# Patient Record
Sex: FEMALE | Race: OTHER | Employment: UNEMPLOYED | URBAN - METROPOLITAN AREA
[De-identification: names, ages, dates, MRNs, and addresses within clinical notes are randomized per-mention and may not be internally consistent; named-entity substitution may affect disease eponyms.]

---

## 2018-04-05 ENCOUNTER — HOSPITAL ENCOUNTER (OUTPATIENT)
Facility: HOSPITAL | Age: 33
Setting detail: OBSERVATION
Discharge: HOME | End: 2018-04-07
Attending: EMERGENCY MEDICINE | Admitting: INTERNAL MEDICINE
Payer: MEDICAID

## 2018-04-05 ENCOUNTER — APPOINTMENT (OUTPATIENT)
Dept: ULTRASOUND IMAGING | Facility: HOSPITAL | Age: 33
End: 2018-04-05
Attending: INTERNAL MEDICINE
Payer: MEDICAID

## 2018-04-05 DIAGNOSIS — R73.9 HYPERGLYCEMIA: ICD-10-CM

## 2018-04-05 DIAGNOSIS — R10.13 EPIGASTRIC PAIN: Primary | ICD-10-CM

## 2018-04-05 PROBLEM — R10.9 ABDOMINAL PAIN: Status: ACTIVE | Noted: 2018-04-05

## 2018-04-05 PROBLEM — E87.6 HYPOKALEMIA: Status: ACTIVE | Noted: 2018-04-05

## 2018-04-05 PROCEDURE — 76705 ECHO EXAM OF ABDOMEN: CPT | Performed by: INTERNAL MEDICINE

## 2018-04-05 RX ORDER — SODIUM CHLORIDE 9 MG/ML
INJECTION, SOLUTION INTRAVENOUS CONTINUOUS
Status: DISCONTINUED | OUTPATIENT
Start: 2018-04-05 | End: 2018-04-07

## 2018-04-05 RX ORDER — MORPHINE SULFATE 2 MG/ML
INJECTION, SOLUTION INTRAMUSCULAR; INTRAVENOUS
Status: DISPENSED
Start: 2018-04-05 | End: 2018-04-05

## 2018-04-05 RX ORDER — HYDROMORPHONE HYDROCHLORIDE 1 MG/ML
0.5 INJECTION, SOLUTION INTRAMUSCULAR; INTRAVENOUS; SUBCUTANEOUS EVERY 4 HOURS PRN
Status: DISCONTINUED | OUTPATIENT
Start: 2018-04-05 | End: 2018-04-07

## 2018-04-05 RX ORDER — OMEPRAZOLE 40 MG/1
40 CAPSULE, DELAYED RELEASE ORAL 2 TIMES DAILY
COMMUNITY
End: 2020-03-05

## 2018-04-05 RX ORDER — INSULIN ASPART 100 [IU]/ML
8 INJECTION, SOLUTION INTRAVENOUS; SUBCUTANEOUS ONCE
Status: COMPLETED | OUTPATIENT
Start: 2018-04-05 | End: 2018-04-05

## 2018-04-05 RX ORDER — MAGNESIUM HYDROXIDE/ALUMINUM HYDROXICE/SIMETHICONE 120; 1200; 1200 MG/30ML; MG/30ML; MG/30ML
30 SUSPENSION ORAL ONCE
Status: COMPLETED | OUTPATIENT
Start: 2018-04-05 | End: 2018-04-05

## 2018-04-05 RX ORDER — LORAZEPAM 2 MG/ML
INJECTION INTRAMUSCULAR
Status: COMPLETED
Start: 2018-04-05 | End: 2018-04-05

## 2018-04-05 RX ORDER — MORPHINE SULFATE 2 MG/ML
2 INJECTION, SOLUTION INTRAMUSCULAR; INTRAVENOUS ONCE
Status: COMPLETED | OUTPATIENT
Start: 2018-04-05 | End: 2018-04-05

## 2018-04-05 RX ORDER — LORAZEPAM 2 MG/ML
0.5 INJECTION INTRAMUSCULAR ONCE
Status: COMPLETED | OUTPATIENT
Start: 2018-04-05 | End: 2018-04-05

## 2018-04-05 RX ORDER — MORPHINE SULFATE 2 MG/ML
2 INJECTION, SOLUTION INTRAMUSCULAR; INTRAVENOUS ONCE
Status: DISCONTINUED | OUTPATIENT
Start: 2018-04-05 | End: 2018-04-05

## 2018-04-05 RX ORDER — DEXTROSE MONOHYDRATE 25 G/50ML
50 INJECTION, SOLUTION INTRAVENOUS AS NEEDED
Status: DISCONTINUED | OUTPATIENT
Start: 2018-04-05 | End: 2018-04-07

## 2018-04-05 RX ORDER — ONDANSETRON 2 MG/ML
4 INJECTION INTRAMUSCULAR; INTRAVENOUS ONCE
Status: COMPLETED | OUTPATIENT
Start: 2018-04-05 | End: 2018-04-05

## 2018-04-05 RX ORDER — HYDROCODONE BITARTRATE AND ACETAMINOPHEN 7.5; 325 MG/1; MG/1
1 TABLET ORAL EVERY 6 HOURS PRN
Status: DISCONTINUED | OUTPATIENT
Start: 2018-04-05 | End: 2018-04-07

## 2018-04-05 NOTE — ED INITIAL ASSESSMENT (HPI)
Pt here with vomiting since Monday. Seen at [facility] on Tuesday. Saw pmd yesterday. Still vomiting. Pain is mid abdomen.  No diarrhea

## 2018-04-05 NOTE — CONSULTS
[facility] HOSP - [facility]    Report of Consultation    Gabbie Carballo Patient Status:  Emergency    1985 MRN [id number]   Location [address] Attending Sri Rebolledo MD   Hosp Day # 0 PCP Rambo Webb MD     Reason f conjunctiva/corneas clear      Ears:     Nose:    Throat:    Neck:   Supple, symmetrical, trachea midline, no adenopathy;        thyroid:  No enlargement/tenderness/nodules; no carotid    bruit or JVD   Back:     Symmetric, no curvature, ROM normal, no CVA

## 2018-04-05 NOTE — ED PROVIDER NOTES
Patient Seen in: [facility] Emergency Department    History   Patient presents with:  Abdomen/Flank Pain (GI/)    Stated Complaint:     HPI    Patient complains of epigastric abdominal pain that began to get worse the past few days.   She was sco negative except as noted above. PSFH elements reviewed from today and agreed except as otherwise stated in HPI.     Physical Exam   ED Triage Vitals [04/05/18 1009]  BP: 143/80  Pulse: 117  Resp: 22  Temp: 98.3 °F (36.8 °C)  Temp src: Oral  SpO2: 100 % Bacteria Urine Few (*)     All other components within normal limits   ACETONE - Normal   EMH POCT PREGNANCY URINE - Normal   RAINBOW DRAW BLUE   RAINBOW DRAW LAVENDER   RAINBOW DRAW DARK GREEN   RAINBOW DRAW LIGHT GREEN   RAINBOW DRAW GOLD   RAINBOW DRAW

## 2018-04-06 ENCOUNTER — APPOINTMENT (OUTPATIENT)
Dept: MRI IMAGING | Facility: HOSPITAL | Age: 33
End: 2018-04-06
Attending: INTERNAL MEDICINE
Payer: MEDICAID

## 2018-04-06 PROCEDURE — 74181 MRI ABDOMEN W/O CONTRAST: CPT | Performed by: INTERNAL MEDICINE

## 2018-04-06 RX ORDER — POTASSIUM CHLORIDE 20 MEQ/1
40 TABLET, EXTENDED RELEASE ORAL EVERY 4 HOURS
Status: COMPLETED | OUTPATIENT
Start: 2018-04-06 | End: 2018-04-06

## 2018-04-06 RX ORDER — MAGNESIUM OXIDE 400 MG (241.3 MG MAGNESIUM) TABLET
400 TABLET ONCE
Status: COMPLETED | OUTPATIENT
Start: 2018-04-06 | End: 2018-04-06

## 2018-04-06 RX ORDER — POTASSIUM CHLORIDE 20 MEQ/1
40 TABLET, EXTENDED RELEASE ORAL ONCE
Status: DISCONTINUED | OUTPATIENT
Start: 2018-04-06 | End: 2018-04-06

## 2018-04-06 RX ORDER — POTASSIUM CHLORIDE 20 MEQ/1
40 TABLET, EXTENDED RELEASE ORAL ONCE
Status: COMPLETED | OUTPATIENT
Start: 2018-04-06 | End: 2018-04-06

## 2018-04-06 NOTE — H&P
[facility] - [facility]    History and Physical    Jessika Perez Patient Status:  Observation    1985 MRN [id number]   Location [facility] Attending Kierra Flanagan MD   Hosp Day # 0 PCP Ihsan Rivera MD     Date:  2018  Date of Ad Therapy Supplies (NEBULIZER/TUBING/MOUTHPIECE) Does not apply Kit Use as needed   albuterol sulfate (2.5 MG/3ML) 0.083% Inhalation Nebu Soln Take 3 mL (2.5 mg total) by nebulization every 4 (four) hours as needed for Wheezing.    Insulin Lispro 100 UNIT/ML 04/06/2018   AST 35 04/06/2018   ALT 57 (H) 04/06/2018   LIP 15 (L) 04/05/2018   MG 1.6 (L) 04/06/2018               Assessment/Plan:     Epigastric pain  ?etiology/ gi consult noted/ ivf's/ ppi/ zofran/compazine/ mri/mrcp of abdomen/ pain control  Dm  Sli

## 2018-04-06 NOTE — PROGRESS NOTES
GI  PROGRESS NOTE    SUBJECTIVE: still c/o RUQ pain; nauseated. Not eating much.        OBJECTIVE:  Temp:  [98.3 °F (36.8 °C)-98.7 °F (37.1 °C)] 98.3 °F (36.8 °C)  Pulse:  [67-96] 80  Resp:  [18-20] 18  BP: (100-140)/(58-80) 113/64  Exam  Gen: No acute di

## 2018-04-06 NOTE — PLAN OF CARE
Problem: Diabetes/Glucose Control  Goal: Glucose maintained within prescribed range  INTERVENTIONS:  - Monitor Blood Glucose as ordered  - Assess for signs and symptoms of hyperglycemia and hypoglycemia  - Administer ordered medications to maintain glucose or PO as ordered and tolerated  - Nasogastric tube to low intermittent suction as ordered  - Evaluate effectiveness of ordered antiemetic medications  - Provide nonpharmacologic comfort measures as appropriate  - Advance diet as tolerated, if ordered  - Ob

## 2018-04-07 VITALS
WEIGHT: 184 LBS | BODY MASS INDEX: 32.6 KG/M2 | RESPIRATION RATE: 18 BRPM | TEMPERATURE: 98 F | OXYGEN SATURATION: 98 % | DIASTOLIC BLOOD PRESSURE: 78 MMHG | HEIGHT: 63 IN | HEART RATE: 69 BPM | SYSTOLIC BLOOD PRESSURE: 115 MMHG

## 2018-04-07 PROBLEM — R10.13 EPIGASTRIC PAIN: Status: RESOLVED | Noted: 2018-04-05 | Resolved: 2018-04-07

## 2018-04-07 PROBLEM — R10.9 ABDOMINAL PAIN: Status: RESOLVED | Noted: 2018-04-05 | Resolved: 2018-04-07

## 2018-04-07 PROBLEM — E87.6 HYPOKALEMIA: Status: RESOLVED | Noted: 2018-04-05 | Resolved: 2018-04-07

## 2018-04-07 PROCEDURE — 99217 OBSERVATION CARE DISCHARGE: CPT | Performed by: INTERNAL MEDICINE

## 2018-04-07 RX ORDER — MAGNESIUM OXIDE 400 MG (241.3 MG MAGNESIUM) TABLET
400 TABLET ONCE
Status: COMPLETED | OUTPATIENT
Start: 2018-04-07 | End: 2018-04-07

## 2018-04-07 RX ORDER — AMOXICILLIN 500 MG/1
1000 CAPSULE ORAL 2 TIMES DAILY
Status: DISCONTINUED | OUTPATIENT
Start: 2018-04-07 | End: 2018-04-07

## 2018-04-07 RX ORDER — AMOXICILLIN 500 MG/1
1000 CAPSULE ORAL 2 TIMES DAILY
Qty: 28 CAPSULE | Refills: 0 | Status: SHIPPED | OUTPATIENT
Start: 2018-04-07 | End: 2018-04-21

## 2018-04-07 RX ORDER — CLARITHROMYCIN 500 MG/1
500 TABLET, COATED ORAL 2 TIMES DAILY
Qty: 28 TABLET | Refills: 0 | Status: SHIPPED | OUTPATIENT
Start: 2018-04-07 | End: 2018-04-21

## 2018-04-07 RX ORDER — CLARITHROMYCIN 500 MG/1
500 TABLET, COATED ORAL 2 TIMES DAILY
Status: DISCONTINUED | OUTPATIENT
Start: 2018-04-07 | End: 2018-04-07

## 2018-04-07 NOTE — PROGRESS NOTES
[facility]  Gastroenterology Progress Note    Nusrat Edwards Patient Status:  Observation    1985 MRN [id number]   Location [facility] Attending Vic Macedo MD   Hosp Day # 0 PCP Maribel Beckham MD     Subjective:  Nusrat Edwards i twice daily. Advised her to then have a follow-up stool tests for Helicobacter pylori antigen in 3 months. Once tolerating oral diet, patient can be discharged for outpatient follow-up, possibly later today    Corinne Davis MD [facility]  4/7/2018  10:26 A

## 2018-04-07 NOTE — PLAN OF CARE
Problem: Diabetes/Glucose Control  Goal: Glucose maintained within prescribed range  INTERVENTIONS:  - Monitor Blood Glucose as ordered  - Assess for signs and symptoms of hyperglycemia and hypoglycemia  - Administer ordered medications to maintain glucose environment to reduce risk of injury  - Provide assistive devices as appropriate  - Consider OT/PT consult to assist with strengthening/mobility  - Encourage toileting schedule   Outcome: Progressing      Problem: Integumentary status not within defined li

## 2018-04-07 NOTE — PLAN OF CARE
Problem: Diabetes/Glucose Control  Goal: Glucose maintained within prescribed range  INTERVENTIONS:  - Monitor Blood Glucose as ordered  - Assess for signs and symptoms of hyperglycemia and hypoglycemia  - Administer ordered medications to maintain glucose intermittent suction as ordered  - Evaluate effectiveness of ordered antiemetic medications  - Provide nonpharmacologic comfort measures as appropriate  - Advance diet as tolerated, if ordered  - Obtain nutritional consult as needed  - Evaluate fluid randall

## 2018-04-07 NOTE — DISCHARGE SUMMARY
DISCHARGE SUMMARY     Lamar King Patient Status:  Observation    1985 MRN [id number]   Location [address] [facility] Attending Lizbeth Alvarado MD   Hosp Day # 0 PCP Dafne Brooks MD     Date of Admission: 2018  Date of Discharge: 2018  D descriptions):        Lab/Test results pending at Discharge:         Consultants:        Discharge Medication List:     Discharge Medications      CONTINUE taking these medications      Instructions Prescription details   Nebulizer Erendira      Use as needed Inhalation Nebu Soln  Take 3 mL (2.5 mg total) by nebulization every 4 (four) hours as needed for Wheezing. Insulin Lispro 100 UNIT/ML Subcutaneous Solution  Inject into the skin 3 (three) times daily before meals.               Discharge Diet: As tolera

## 2018-04-08 ENCOUNTER — TELEPHONE (OUTPATIENT)
Dept: CARDIOLOGY UNIT | Facility: HOSPITAL | Age: 33
End: 2018-04-08

## 2018-04-30 ENCOUNTER — APPOINTMENT (OUTPATIENT)
Dept: GENERAL RADIOLOGY | Age: 33
End: 2018-04-30
Attending: NURSE PRACTITIONER
Payer: MEDICAID

## 2018-04-30 ENCOUNTER — HOSPITAL ENCOUNTER (OUTPATIENT)
Age: 33
Discharge: HOME | End: 2018-04-30
Payer: MEDICAID

## 2018-04-30 VITALS
WEIGHT: 185 LBS | DIASTOLIC BLOOD PRESSURE: 70 MMHG | RESPIRATION RATE: 18 BRPM | HEIGHT: 63 IN | TEMPERATURE: 98 F | BODY MASS INDEX: 32.78 KG/M2 | HEART RATE: 81 BPM | OXYGEN SATURATION: 100 % | SYSTOLIC BLOOD PRESSURE: 127 MMHG

## 2018-04-30 DIAGNOSIS — S93.402A MILD SPRAIN OF LEFT ANKLE, INITIAL ENCOUNTER: Primary | ICD-10-CM

## 2018-04-30 PROCEDURE — 99213 OFFICE O/P EST LOW 20 MIN: CPT

## 2018-04-30 PROCEDURE — 73610 X-RAY EXAM OF ANKLE: CPT | Performed by: NURSE PRACTITIONER

## 2018-04-30 PROCEDURE — 99203 OFFICE O/P NEW LOW 30 MIN: CPT

## 2018-04-30 RX ORDER — IBUPROFEN 600 MG/1
600 TABLET ORAL ONCE
Status: COMPLETED | OUTPATIENT
Start: 2018-04-30 | End: 2018-04-30

## 2018-05-01 NOTE — ED PROVIDER NOTES
Patient presents with:  Lower Extremity Injury (musculoskeletal)      HPI:     Gary Ascencio is a 29year old female who presents today with a chief complaint of pain in the left lateral ankle after fall that occurred prior to arrival.  The patient was run Yes, lateral aspect of the left ankle  Bruising:  No  Tendon function intact:  Yes  Skin intact:  Yes  Normal sensation: Yes  Normal capillary refill: Yes  ROM:  pain to the left lateral ankle with ROM and ambulation. No deformity. No heel pain.  No [place] and discussed with patient. See AVS for detailed discharge instructions for your condition today.     Follow Up with:  MD Sea Garcaia Myron [address] [place] [address]  [phone]    Schedule an appointment as soon as poss

## 2018-10-26 ENCOUNTER — HOSPITAL ENCOUNTER (OUTPATIENT)
Age: 33
Discharge: OTHER INSTITUTION | End: 2018-10-26
Attending: EMERGENCY MEDICINE
Payer: MEDICAID

## 2018-10-26 VITALS
RESPIRATION RATE: 18 BRPM | HEIGHT: 63 IN | OXYGEN SATURATION: 98 % | DIASTOLIC BLOOD PRESSURE: 78 MMHG | TEMPERATURE: 98 F | BODY MASS INDEX: 31.54 KG/M2 | SYSTOLIC BLOOD PRESSURE: 133 MMHG | WEIGHT: 178 LBS | HEART RATE: 88 BPM

## 2018-10-26 DIAGNOSIS — R10.2 PELVIC PAIN IN FEMALE: Primary | ICD-10-CM

## 2018-10-26 PROCEDURE — 99213 OFFICE O/P EST LOW 20 MIN: CPT

## 2018-10-26 PROCEDURE — 99214 OFFICE O/P EST MOD 30 MIN: CPT

## 2018-10-26 PROCEDURE — 81003 URINALYSIS AUTO W/O SCOPE: CPT

## 2018-10-26 PROCEDURE — 81025 URINE PREGNANCY TEST: CPT

## 2018-10-26 NOTE — ED PROVIDER NOTES
Patient Seen in: [facility]    History   Patient presents with:  Pelvic Pain    Stated Complaint: Pelvic Pain    HPI  Patient awoke this morning with pelvic pain now seems to be localized to the right side.   Patient has a Physical Exam   Constitutional: She is oriented to person, place, and time. She appears well-developed and well-nourished. No distress. Well appearing   HENT:   Head: Normocephalic and atraumatic.    Right Ear: External ear normal.   Left Ear: Ext List

## 2018-10-26 NOTE — ED INITIAL ASSESSMENT (HPI)
PATIENT C/O PELVIC PAIN UPON WAKING UP THIS AM, PATIENT THINKS SHE MAY BE PREGNANT AS SHE HAS BREAST DISCOMFORT AND INCREASED SENSE OF SMELL. REPORTS CLEAR VAGINAL DISCHARGE. DENIES FEVERS.

## 2018-12-21 ENCOUNTER — APPOINTMENT (OUTPATIENT)
Dept: GENERAL RADIOLOGY | Age: 33
End: 2018-12-21
Attending: EMERGENCY MEDICINE
Payer: MEDICAID

## 2018-12-21 ENCOUNTER — HOSPITAL ENCOUNTER (OUTPATIENT)
Age: 33
Discharge: HOME | End: 2018-12-21
Attending: EMERGENCY MEDICINE
Payer: MEDICAID

## 2018-12-21 VITALS
HEART RATE: 76 BPM | OXYGEN SATURATION: 98 % | TEMPERATURE: 98 F | SYSTOLIC BLOOD PRESSURE: 111 MMHG | DIASTOLIC BLOOD PRESSURE: 51 MMHG | RESPIRATION RATE: 20 BRPM | WEIGHT: 174 LBS | BODY MASS INDEX: 30.83 KG/M2 | HEIGHT: 63 IN

## 2018-12-21 DIAGNOSIS — M25.539 PAIN IN WRIST, UNSPECIFIED LATERALITY: Primary | ICD-10-CM

## 2018-12-21 PROCEDURE — 73110 X-RAY EXAM OF WRIST: CPT | Performed by: EMERGENCY MEDICINE

## 2018-12-21 PROCEDURE — 99213 OFFICE O/P EST LOW 20 MIN: CPT

## 2018-12-21 NOTE — ED PROVIDER NOTES
[facility]    History     Stated Complaint: pain in right wrist    HPI    Patient complains of right wrist pain for the past day.   Patient denies any direct trauma or fall to the wrist.  The patient state 30.82 kg/m²         Physical Exam    Patient is awake and alert  On exam of the right arm there is no gross deformity dislocation erythema or ecchymosis. Positive tenderness over the distal ulnar bone . No tenderness to the radius. No hand tenderness.

## 2019-01-22 ENCOUNTER — HOSPITAL ENCOUNTER (OUTPATIENT)
Age: 34
Discharge: HOME | End: 2019-01-22
Attending: EMERGENCY MEDICINE
Payer: MEDICAID

## 2019-01-22 VITALS
TEMPERATURE: 98 F | RESPIRATION RATE: 18 BRPM | HEIGHT: 63 IN | DIASTOLIC BLOOD PRESSURE: 69 MMHG | OXYGEN SATURATION: 99 % | BODY MASS INDEX: 31.01 KG/M2 | WEIGHT: 175 LBS | SYSTOLIC BLOOD PRESSURE: 120 MMHG | HEART RATE: 88 BPM

## 2019-01-22 DIAGNOSIS — H66.90 ACUTE OTITIS MEDIA, UNSPECIFIED OTITIS MEDIA TYPE: Primary | ICD-10-CM

## 2019-01-22 PROCEDURE — 99214 OFFICE O/P EST MOD 30 MIN: CPT

## 2019-01-22 PROCEDURE — 99213 OFFICE O/P EST LOW 20 MIN: CPT

## 2019-01-22 RX ORDER — AMOXICILLIN 875 MG/1
875 TABLET, COATED ORAL 2 TIMES DAILY
Qty: 14 TABLET | Refills: 0 | Status: SHIPPED | OUTPATIENT
Start: 2019-01-22 | End: 2019-01-29

## 2019-01-22 RX ORDER — FLUTICASONE PROPIONATE 50 MCG
1-2 SPRAY, SUSPENSION (ML) NASAL DAILY
Qty: 16 G | Refills: 0 | Status: SHIPPED | OUTPATIENT
Start: 2019-01-22 | End: 2019-02-21

## 2019-01-23 NOTE — ED INITIAL ASSESSMENT (HPI)
REPORTS RIGHT EAR PAIN SINCE YESTERDAY EVENING. TAKING ALEVE WITHOUT IMPROVEMENT IN SYMPTOMS. PATIENT HAS BEEN TOLD THAT SHE HAS ALLERGIES.

## 2019-01-23 NOTE — ED PROVIDER NOTES
Patient Seen in: [facility]    History   Patient presents with:  Ear Problem Pain (neurosensory)    Stated Complaint: ear pain     HPI  Ear pain started last night. No fever or chills. No runny nose or sore throat.   Had erythematous. Nose: Nose normal.   Mouth/Throat: Uvula is midline, oropharynx is clear and moist and mucous membranes are normal.   Eyes: Conjunctivae and EOM are normal.   Neck: Normal range of motion. Neck supple.    Cardiovascular: Normal rate, regular

## 2019-06-01 ENCOUNTER — HOSPITAL ENCOUNTER (OUTPATIENT)
Age: 34
Discharge: HOME | End: 2019-06-01
Attending: EMERGENCY MEDICINE
Payer: MEDICAID

## 2019-06-01 VITALS
TEMPERATURE: 99 F | DIASTOLIC BLOOD PRESSURE: 77 MMHG | BODY MASS INDEX: 31.01 KG/M2 | RESPIRATION RATE: 18 BRPM | WEIGHT: 175 LBS | SYSTOLIC BLOOD PRESSURE: 135 MMHG | HEART RATE: 85 BPM | HEIGHT: 63 IN | OXYGEN SATURATION: 97 %

## 2019-06-01 DIAGNOSIS — R05.8 PRODUCTIVE COUGH: Primary | ICD-10-CM

## 2019-06-01 DIAGNOSIS — Z86.39 HISTORY OF TYPE 1 DIABETES MELLITUS: ICD-10-CM

## 2019-06-01 PROCEDURE — 99213 OFFICE O/P EST LOW 20 MIN: CPT

## 2019-06-01 PROCEDURE — 87430 STREP A AG IA: CPT

## 2019-06-01 PROCEDURE — 99214 OFFICE O/P EST MOD 30 MIN: CPT

## 2019-06-01 RX ORDER — AMOXICILLIN 875 MG/1
875 TABLET, COATED ORAL 2 TIMES DAILY
Qty: 20 TABLET | Refills: 0 | Status: SHIPPED | OUTPATIENT
Start: 2019-06-01 | End: 2019-06-11

## 2019-06-01 RX ORDER — BENZONATATE 100 MG/1
100 CAPSULE ORAL 3 TIMES DAILY PRN
Qty: 30 CAPSULE | Refills: 0 | Status: SHIPPED | OUTPATIENT
Start: 2019-06-01 | End: 2019-07-01

## 2019-06-01 NOTE — ED PROVIDER NOTES
Patient Seen in: [facility]    History   Patient presents with:  Cough/URI    Stated Complaint: sore throat     HPI    The patient is a 26-year-old female with a past history of type 1 diabetes, gastroparesis who presents reviewed and negative except as noted above.     Physical Exam     ED Triage Vitals [06/01/19 0925]   /77   Pulse 85   Resp 18   Temp 98.7 °F (37.1 °C)   Temp src Oral   SpO2 97 %   O2 Device None (Room air)       Current:/77   Pulse 85   Temp 9 [address]  [phone]    In 3 days  If symptoms worsen        Medications Prescribed:  Current Discharge Medication List    START taking these medications    amoxicillin 875 MG Oral Tab  Take 1 tablet (875 mg total) by mouth 2 (two) times daily for 10 d

## 2019-06-05 ENCOUNTER — HOSPITAL ENCOUNTER (OUTPATIENT)
Age: 34
Discharge: HOME | End: 2019-06-05
Payer: MEDICAID

## 2019-06-05 VITALS
SYSTOLIC BLOOD PRESSURE: 111 MMHG | HEART RATE: 102 BPM | TEMPERATURE: 99 F | OXYGEN SATURATION: 97 % | WEIGHT: 170 LBS | DIASTOLIC BLOOD PRESSURE: 59 MMHG | BODY MASS INDEX: 30 KG/M2 | RESPIRATION RATE: 20 BRPM

## 2019-06-05 DIAGNOSIS — J06.9 UPPER RESPIRATORY TRACT INFECTION, UNSPECIFIED TYPE: ICD-10-CM

## 2019-06-05 DIAGNOSIS — H65.192 OTHER ACUTE NONSUPPURATIVE OTITIS MEDIA OF LEFT EAR, RECURRENCE NOT SPECIFIED: Primary | ICD-10-CM

## 2019-06-05 PROCEDURE — 99214 OFFICE O/P EST MOD 30 MIN: CPT

## 2019-06-05 PROCEDURE — 99213 OFFICE O/P EST LOW 20 MIN: CPT

## 2019-06-05 RX ORDER — CEFDINIR 300 MG/1
300 CAPSULE ORAL 2 TIMES DAILY
Qty: 20 CAPSULE | Refills: 0 | Status: SHIPPED | OUTPATIENT
Start: 2019-06-05 | End: 2019-06-15

## 2019-06-06 NOTE — ED INITIAL ASSESSMENT (HPI)
PATIENT ARRIVED AMBULATORY TO ROOM C/O BILATERAL EAR PAIN. PATIENT STATES SHE WAS SEEN IN THE IC 4 DAYS AGO FOR A COUGH AND SORE THROAT. PATIENT STATES SHE IS COMPLIANT WITH ANTIBIOTICS THAT WERE PRESCRIBED. NO FEVERS.

## 2019-06-06 NOTE — ED PROVIDER NOTES
Patient presents with:  Ear Pain      HPI:     Gary Ascencio is a 34year old female who presents with a chief complaint of left ear pain that started a couple days ago. The patient was seen here 4 days ago and diagnosed with a virus.   She was placed on a date: 2017        Years since quittin.4      Smokeless tobacco: Never Used      Tobacco comment: 3-4 cigarettes a day    Substance and Sexual Activity      Alcohol use: No      Drug use: No      Sexual activity: Not on file    Lifestyle      Physic noted:  bronchospastic. Nonlabored resps. MDM/Assessment/Plan:   Orders for this encounter:    Orders Placed This Encounter      cefdinir 300 MG Oral Cap          Sig: Take 1 capsule (300 mg total) by mouth 2 (two) times daily for 10 days.           Disp

## 2020-02-13 ENCOUNTER — HOSPITAL ENCOUNTER (OUTPATIENT)
Age: 35
Discharge: HOME | End: 2020-02-13
Attending: EMERGENCY MEDICINE
Payer: MEDICAID

## 2020-02-13 VITALS
BODY MASS INDEX: 28.25 KG/M2 | RESPIRATION RATE: 18 BRPM | SYSTOLIC BLOOD PRESSURE: 105 MMHG | HEART RATE: 93 BPM | TEMPERATURE: 99 F | OXYGEN SATURATION: 98 % | HEIGHT: 67 IN | WEIGHT: 180 LBS | DIASTOLIC BLOOD PRESSURE: 70 MMHG

## 2020-02-13 DIAGNOSIS — J06.9 VIRAL UPPER RESPIRATORY TRACT INFECTION: Primary | ICD-10-CM

## 2020-02-13 PROCEDURE — 99213 OFFICE O/P EST LOW 20 MIN: CPT

## 2020-02-13 PROCEDURE — 99214 OFFICE O/P EST MOD 30 MIN: CPT

## 2020-02-13 RX ORDER — BENZONATATE 100 MG/1
100 CAPSULE ORAL 3 TIMES DAILY PRN
Qty: 20 CAPSULE | Refills: 0 | Status: SHIPPED | OUTPATIENT
Start: 2020-02-13 | End: 2020-03-05 | Stop reason: ALTCHOICE

## 2020-02-13 RX ORDER — CODEINE PHOSPHATE AND GUAIFENESIN 10; 100 MG/5ML; MG/5ML
10 SOLUTION ORAL EVERY 12 HOURS PRN
Qty: 118 ML | Refills: 0 | Status: SHIPPED | OUTPATIENT
Start: 2020-02-13 | End: 2020-02-20

## 2020-02-13 NOTE — ED PROVIDER NOTES
Patient Seen in: [facility] [address] [place] [address]      History   Patient presents with:  Cough/URI    Stated Complaint: cough,body aches,fever    HPI    The patient is a 37-year-old female who presents with 5 days of cough fever congestion so General: She is in acute distress. Appearance: Normal appearance. She is well-developed. She is not ill-appearing.    HENT:      Right Ear: Tympanic membrane and external ear normal.      Left Ear: Tympanic membrane and external ear normal. No mas symptoms worsen        Medications Prescribed:  Current Discharge Medication List    START taking these medications    benzonatate 100 MG Oral Cap  Take 1 capsule (100 mg total) by mouth 3 (three) times daily as needed for cough.   Qty: 20 capsule Refills:

## 2020-03-05 ENCOUNTER — OFFICE VISIT (OUTPATIENT)
Dept: GASTROENTEROLOGY | Facility: CLINIC | Age: 35
End: 2020-03-05
Payer: MEDICAID

## 2020-03-05 ENCOUNTER — TELEPHONE (OUTPATIENT)
Dept: GASTROENTEROLOGY | Facility: CLINIC | Age: 35
End: 2020-03-05

## 2020-03-05 VITALS — DIASTOLIC BLOOD PRESSURE: 87 MMHG | SYSTOLIC BLOOD PRESSURE: 130 MMHG | HEART RATE: 98 BPM

## 2020-03-05 DIAGNOSIS — R45.89 DEPRESSED MOOD: ICD-10-CM

## 2020-03-05 DIAGNOSIS — R11.2 NAUSEA AND VOMITING, INTRACTABILITY OF VOMITING NOT SPECIFIED, UNSPECIFIED VOMITING TYPE: ICD-10-CM

## 2020-03-05 DIAGNOSIS — R10.13 EPIGASTRIC PAIN: ICD-10-CM

## 2020-03-05 DIAGNOSIS — K52.9 CHRONIC DIARRHEA: Primary | ICD-10-CM

## 2020-03-05 PROCEDURE — 99215 OFFICE O/P EST HI 40 MIN: CPT | Performed by: NURSE PRACTITIONER

## 2020-03-05 RX ORDER — OMEPRAZOLE 40 MG/1
40 CAPSULE, DELAYED RELEASE ORAL 2 TIMES DAILY
Qty: 60 CAPSULE | Refills: 3 | Status: SHIPPED | OUTPATIENT
Start: 2020-03-05 | End: 2020-04-04

## 2020-03-05 RX ORDER — SUCRALFATE 1 G/1
1 TABLET ORAL
Qty: 90 TABLET | Refills: 0 | Status: SHIPPED | OUTPATIENT
Start: 2020-03-05 | End: 2020-04-04

## 2020-03-05 NOTE — H&P
[address] [facility] [address] Gastroenterology                                                                                                  Clinic History and Physical     Pa was hospitalized for these complaints in April 2018) and found to be H. pylori positive which was treated with amoxicillin, Biaxin, and PPIs. H. pylori testing completed in January 2020 (off PPIs) was negative.     She was then evaluated by her previous PC nausea/vomiting, abdominal pain, chronic diarrhea, findings per patient: Unremarkable    Social Hx:  + current some day smoker  - No etoh  + cannabis   - LMP: 2/13/20  - Lives with significant other/children  - NSAIDs/ASA use: PRN    History, Medications, fevers, rigors  EYES:  negative for diplopia   RESPIRATORY:  negative for severe shortness of breath  CARDIOVASCULAR:  negative for crushing sub-sternal chest pain  GASTROINTESTINAL:  see HPI  GENITOURINARY:  negative for dysuria or gross hematuria  INTEGU which has been increasing in frequency. She reports a prior GI work-up 2 years ago for the symptoms. These outside records have been requested for review. She has been told by her PCP that she may have gastroparesis.   Her diabetic control has improved b questions/concerns were addressed. >60 minutes was spent with >50% in patient consultation/coordination of care    2.  Chronic diarrhea: Patient reports a history of chronic diarrhea that has been ongoing since she had her gallbladder removed a few years a months          Orders This Visit:  Orders Placed This Encounter      CBC W Differential W Platelet      Lipase      Hepatic Function Panel (7)      TSH (Assay, Thyroid Stim Hormone)      Basic Metabolic Panel (8)      C. diff toxigenic PCR (OPT)      Ova

## 2020-03-05 NOTE — TELEPHONE ENCOUNTER
Signed ANGUS form faxed to Dr. Cannon Many office for prior endoscopy reports, labs, imaging, etc.     Fax: [phone]  Phone: [phone]

## 2020-03-05 NOTE — PATIENT INSTRUCTIONS
1. Complete stool testing and blood work  2. Stop cannabis  3. Take omeprazole 40 mg twice daily  4. Dissolve Carafate tablets in water and take as needed for pain  5. Sign for outside records  6.  Work on diabetic control - consider seeing a diabetic educa

## 2020-03-26 NOTE — TELEPHONE ENCOUNTER
Outside records from Dr. Marisa Avila (gastroenterology) were reviewed and sent to scanning following review.     1.  September 2018 EGD/colonoscopy performed by Dr. Satnam Thomson with MAC related to nonulcerative dyspepsia and chronic diarrhea, findings: H

## 2020-03-31 NOTE — TELEPHONE ENCOUNTER
Duke Higuera    I have attempted to contact patient many times to review below message with no call back or answer. I mailed a no response letter to patient's home about below recommendations.

## 2020-04-08 ENCOUNTER — TELEPHONE (OUTPATIENT)
Dept: GASTROENTEROLOGY | Facility: CLINIC | Age: 35
End: 2020-04-08

## 2020-04-08 NOTE — TELEPHONE ENCOUNTER
Overdue reminder letter mailed out to patient.     Labs:   ASSAY, THYROID STIM HORMONE    BASIC METABOLIC PANEL (8)    CBC WITH DIFFERENTIAL WITH PLATELET    CDIFFICILE TOXIGENIC PCR (OPT)    GIARDIA + CRYPTO ANTIGEN, STOOL    HEPATIC FUNCTION PANEL (7)

## 2021-04-15 PROBLEM — R11.2 NON-INTRACTABLE VOMITING WITH NAUSEA, UNSPECIFIED VOMITING TYPE: Status: ACTIVE | Noted: 2021-04-15

## 2021-04-15 PROBLEM — Z87.11 HISTORY OF PEPTIC ULCER DISEASE: Status: ACTIVE | Noted: 2021-04-15

## 2021-04-15 PROBLEM — E11.43 GASTROPARESIS DUE TO DM: Status: ACTIVE | Noted: 2021-04-15

## 2021-04-15 PROBLEM — K31.84 GASTROPARESIS DUE TO DM (HCC): Status: ACTIVE | Noted: 2021-04-15

## 2021-04-15 PROBLEM — E10.9 TYPE 1 DIABETES MELLITUS WITHOUT COMPLICATION (HCC): Status: ACTIVE | Noted: 2021-04-15

## 2021-04-15 PROBLEM — K58.0 IRRITABLE BOWEL SYNDROME WITH DIARRHEA: Status: ACTIVE | Noted: 2021-04-15

## 2021-04-15 PROBLEM — K31.84 GASTROPARESIS DUE TO DM: Status: ACTIVE | Noted: 2021-04-15

## 2021-04-15 PROBLEM — E11.43 GASTROPARESIS DUE TO DM (HCC): Status: ACTIVE | Noted: 2021-04-15

## 2021-04-15 PROBLEM — A04.8 H. PYLORI INFECTION: Status: ACTIVE | Noted: 2021-04-15

## 2021-04-15 PROBLEM — F41.8 DEPRESSION WITH ANXIETY: Status: ACTIVE | Noted: 2021-04-15

## 2021-04-15 PROBLEM — K21.9 GASTROESOPHAGEAL REFLUX DISEASE, UNSPECIFIED WHETHER ESOPHAGITIS PRESENT: Status: ACTIVE | Noted: 2021-04-15

## 2021-05-09 ENCOUNTER — HOSPITAL ENCOUNTER (OUTPATIENT)
Age: 36
Discharge: HOME | End: 2021-05-09
Payer: MEDICAID

## 2021-05-09 ENCOUNTER — APPOINTMENT (OUTPATIENT)
Dept: GENERAL RADIOLOGY | Age: 36
End: 2021-05-09
Attending: NURSE PRACTITIONER
Payer: MEDICAID

## 2021-05-09 VITALS
HEART RATE: 96 BPM | RESPIRATION RATE: 18 BRPM | DIASTOLIC BLOOD PRESSURE: 76 MMHG | OXYGEN SATURATION: 99 % | SYSTOLIC BLOOD PRESSURE: 112 MMHG | TEMPERATURE: 98 F

## 2021-05-09 DIAGNOSIS — J06.9 UPPER RESPIRATORY VIRUS: Primary | ICD-10-CM

## 2021-05-09 DIAGNOSIS — Z20.822 LAB TEST NEGATIVE FOR COVID-19 VIRUS: ICD-10-CM

## 2021-05-09 PROCEDURE — 87880 STREP A ASSAY W/OPTIC: CPT | Performed by: NURSE PRACTITIONER

## 2021-05-09 PROCEDURE — 99213 OFFICE O/P EST LOW 20 MIN: CPT | Performed by: NURSE PRACTITIONER

## 2021-05-09 PROCEDURE — 71046 X-RAY EXAM CHEST 2 VIEWS: CPT | Performed by: NURSE PRACTITIONER

## 2021-05-09 PROCEDURE — U0002 COVID-19 LAB TEST NON-CDC: HCPCS | Performed by: NURSE PRACTITIONER

## 2021-05-09 RX ORDER — ALBUTEROL SULFATE 90 UG/1
2 AEROSOL, METERED RESPIRATORY (INHALATION) EVERY 4 HOURS PRN
Qty: 1 INHALER | Refills: 0 | Status: SHIPPED | OUTPATIENT
Start: 2021-05-09 | End: 2021-06-08

## 2021-05-09 NOTE — ED PROVIDER NOTES
No chief complaint on file. HPI:     Jasmina Kumari is a 37year old female with history of type 1 diabetes presents for runny nose, productive cough with green and yellow sputum, intermittent wheezing, and a sore throat that started 6 days ago.   Her d Ran Out of Food in the Last Year:   Transportation Needs:       Lack of Transportation (Medical):       Lack of Transportation (Non-Medical):   Physical Activity:       Days of Exercise per Week:       Minutes of Exercise per Session:   Stress:       Feeli Monday.                          Order Specific Question: What is the Relevant Clinical Indication / Reason for Exam?          Answer: Cough, congestion - covid test          Order Specific Question: Release to patient          Answer: Immediate      POCT R instructions for your condition today. Follow Up with:  Phil Panda [address].   [facility] [id number] [phone]    Schedule an appointment as soon as possible for a visit in 2 days

## 2022-11-03 ENCOUNTER — HOSPITAL ENCOUNTER (OUTPATIENT)
Age: 37
Discharge: HOME | End: 2022-11-03
Payer: MEDICAID

## 2022-11-03 ENCOUNTER — APPOINTMENT (OUTPATIENT)
Dept: GENERAL RADIOLOGY | Age: 37
End: 2022-11-03
Attending: NURSE PRACTITIONER
Payer: MEDICAID

## 2022-11-03 VITALS
HEART RATE: 83 BPM | OXYGEN SATURATION: 99 % | WEIGHT: 165 LBS | TEMPERATURE: 97 F | SYSTOLIC BLOOD PRESSURE: 136 MMHG | RESPIRATION RATE: 20 BRPM | BODY MASS INDEX: 29.23 KG/M2 | HEIGHT: 63 IN | DIASTOLIC BLOOD PRESSURE: 76 MMHG

## 2022-11-03 DIAGNOSIS — S20.211A RIB CONTUSION, RIGHT, INITIAL ENCOUNTER: ICD-10-CM
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
## 2022-11-03 PROCEDURE — 99213 OFFICE O/P EST LOW 20 MIN: CPT | Performed by: NURSE PRACTITIONER

## 2022-11-03 PROCEDURE — 71101 X-RAY EXAM UNILAT RIBS/CHEST: CPT | Performed by: NURSE PRACTITIONER

## 2022-11-04 NOTE — DISCHARGE INSTRUCTIONS
Ice to the area of soreness. Ibuprofen for pain. Follow-up with your doctor. For any worsening symptoms, go to the nearest emergency department for further evaluation.

## 2022-11-04 NOTE — ED INITIAL ASSESSMENT (HPI)
Patient presents a&o 4/4 c/o R rib, R-sided lower chest/upper abdominal pain. States fell yesterday. Pt was standing next to bathtub and the mat did not have traction on wet floor. Patient slipped and hit rib/abd area on the ledge of tub. Denies hitting head, denies neck pain.  Denies LOC

## 2022-11-08 ENCOUNTER — HOSPITAL ENCOUNTER (EMERGENCY)
Facility: HOSPITAL | Age: 37
Discharge: HOME | End: 2022-11-08
Attending: EMERGENCY MEDICINE
Payer: MEDICAID

## 2022-11-08 ENCOUNTER — APPOINTMENT (OUTPATIENT)
Dept: CT IMAGING | Facility: HOSPITAL | Age: 37
End: 2022-11-08
Attending: EMERGENCY MEDICINE
Payer: MEDICAID

## 2022-11-08 VITALS
HEART RATE: 86 BPM | OXYGEN SATURATION: 100 % | DIASTOLIC BLOOD PRESSURE: 63 MMHG | SYSTOLIC BLOOD PRESSURE: 111 MMHG | HEIGHT: 63 IN | WEIGHT: 169 LBS | TEMPERATURE: 98 F | BODY MASS INDEX: 29.95 KG/M2 | RESPIRATION RATE: 18 BRPM

## 2022-11-08 DIAGNOSIS — S20.211A CONTUSION OF RIGHT CHEST WALL, INITIAL ENCOUNTER: Primary | ICD-10-CM

## 2022-11-08 LAB
ALBUMIN SERPL-MCNC: 3.4 G/DL (ref 3.4–5)
ALP LIVER SERPL-CCNC: 89 U/L
ALT SERPL-CCNC: 20 U/L
ANION GAP SERPL CALC-SCNC: 6 MMOL/L (ref 0–18)
AST SERPL-CCNC: 18 U/L (ref 15–37)
B-HCG UR QL: NEGATIVE
BASOPHILS # BLD AUTO: 0.04 X10(3) UL (ref 0–0.2)
BASOPHILS NFR BLD AUTO: 0.4 %
BILIRUB DIRECT SERPL-MCNC: <0.1 MG/DL (ref 0–0.2)
BILIRUB SERPL-MCNC: 0.3 MG/DL (ref 0.1–2)
BUN BLD-MCNC: 8 MG/DL (ref 7–18)
BUN/CREAT SERPL: 11.8 (ref 10–20)
CALCIUM BLD-MCNC: 8.9 MG/DL (ref 8.5–10.1)
CHLORIDE SERPL-SCNC: 107 MMOL/L (ref 98–112)
CO2 SERPL-SCNC: 25 MMOL/L (ref 21–32)
CREAT BLD-MCNC: 0.68 MG/DL
DEPRECATED RDW RBC AUTO: 43.1 FL (ref 35.1–46.3)
EOSINOPHIL # BLD AUTO: 0.91 X10(3) UL (ref 0–0.7)
EOSINOPHIL NFR BLD AUTO: 8 %
ERYTHROCYTE [DISTWIDTH] IN BLOOD BY AUTOMATED COUNT: 13.2 % (ref 11–15)
GFR SERPLBLD BASED ON 1.73 SQ M-ARVRAT: 116 ML/MIN/1.73M2 (ref 60–?)
GLUCOSE BLD-MCNC: 201 MG/DL (ref 70–99)
HCT VFR BLD AUTO: 40.7 %
HGB BLD-MCNC: 13.1 G/DL
IMM GRANULOCYTES # BLD AUTO: 0.04 X10(3) UL (ref 0–1)
IMM GRANULOCYTES NFR BLD: 0.4 %
LYMPHOCYTES # BLD AUTO: 3.47 X10(3) UL (ref 1–4)
LYMPHOCYTES NFR BLD AUTO: 30.7 %
MCH RBC QN AUTO: 28.7 PG (ref 26–34)
MCHC RBC AUTO-ENTMCNC: 32.2 G/DL (ref 31–37)
MCV RBC AUTO: 89.3 FL
MONOCYTES # BLD AUTO: 0.53 X10(3) UL (ref 0.1–1)
MONOCYTES NFR BLD AUTO: 4.7 %
NEUTROPHILS # BLD AUTO: 6.32 X10 (3) UL (ref 1.5–7.7)
NEUTROPHILS # BLD AUTO: 6.32 X10(3) UL (ref 1.5–7.7)
NEUTROPHILS NFR BLD AUTO: 55.8 %
OSMOLALITY SERPL CALC.SUM OF ELEC: 290 MOSM/KG (ref 275–295)
PLATELET # BLD AUTO: 188 10(3)UL (ref 150–450)
POTASSIUM SERPL-SCNC: 4 MMOL/L (ref 3.5–5.1)
PROT SERPL-MCNC: 7.8 G/DL (ref 6.4–8.2)
RBC # BLD AUTO: 4.56 X10(6)UL
SODIUM SERPL-SCNC: 138 MMOL/L (ref 136–145)
WBC # BLD AUTO: 11.3 X10(3) UL (ref 4–11)

## 2022-11-08 PROCEDURE — 81025 URINE PREGNANCY TEST: CPT

## 2022-11-08 PROCEDURE — 85025 COMPLETE CBC W/AUTO DIFF WBC: CPT | Performed by: EMERGENCY MEDICINE

## 2022-11-08 PROCEDURE — 80048 BASIC METABOLIC PNL TOTAL CA: CPT | Performed by: EMERGENCY MEDICINE

## 2022-11-08 PROCEDURE — 36415 COLL VENOUS BLD VENIPUNCTURE: CPT

## 2022-11-08 PROCEDURE — 99284 EMERGENCY DEPT VISIT MOD MDM: CPT

## 2022-11-08 PROCEDURE — 80076 HEPATIC FUNCTION PANEL: CPT | Performed by: EMERGENCY MEDICINE

## 2022-11-08 PROCEDURE — 71260 CT THORAX DX C+: CPT | Performed by: EMERGENCY MEDICINE

## 2022-11-08 NOTE — ED INITIAL ASSESSMENT (HPI)
Pt came in w/ c/o fall last Wednesday when bathing daughter slipped on rug and fell on right side causing pain with rib cage. Per pt went to urgent care and was told pain should get better. Per pt pain has not gotten better. Pt c/o right upper chest and right side pain related to fall.   Pt denies all other complaints at the moment

## 2023-01-18 ENCOUNTER — HOSPITAL ENCOUNTER (OUTPATIENT)
Age: 38
Discharge: HOME | End: 2023-01-18
Payer: MEDICAID

## 2023-01-18 VITALS
DIASTOLIC BLOOD PRESSURE: 71 MMHG | TEMPERATURE: 97 F | OXYGEN SATURATION: 100 % | RESPIRATION RATE: 18 BRPM | SYSTOLIC BLOOD PRESSURE: 129 MMHG | HEART RATE: 104 BPM

## 2023-01-18 DIAGNOSIS — J06.9 VIRAL URI: Primary | ICD-10-CM

## 2023-01-18 DIAGNOSIS — R05.9 COUGH: ICD-10-CM

## 2023-01-18 LAB
POCT INFLUENZA A: NEGATIVE
POCT INFLUENZA B: NEGATIVE

## 2023-01-18 PROCEDURE — 87502 INFLUENZA DNA AMP PROBE: CPT | Performed by: NURSE PRACTITIONER

## 2023-01-18 PROCEDURE — 99213 OFFICE O/P EST LOW 20 MIN: CPT | Performed by: NURSE PRACTITIONER

## 2023-01-18 NOTE — ED INITIAL ASSESSMENT (HPI)
Headache,sinus congestion, sneezing, runny nose, cough, chills since Sunday, no fever, recent + covid in December

## 2023-01-18 NOTE — DISCHARGE INSTRUCTIONS
Flu is negative   Everett Carmona sinus rinse 3-4 times per day  Flonase nasal spray 2 sprays in each nostril daily  Mucinex DM 1 tablet twice per day   Lots of fluids  Steam showers  If you develop chest pain or shortness or breath, please return  If your symptoms do not improve in 1 week, please see your doctor

## 2023-01-26 NOTE — PLAN OF CARE
Problem: Diabetes/Glucose Control  Goal: Glucose maintained within prescribed range  INTERVENTIONS:  - Monitor Blood Glucose as ordered  - Assess for signs and symptoms of hyperglycemia and hypoglycemia  - Administer ordered medications to maintain glucose Modify environment to reduce risk of injury  - Provide assistive devices as appropriate  - Consider OT/PT consult to assist with strengthening/mobility  - Encourage toileting schedule   Outcome: Progressing      Problem: Integumentary status not within def dinner. Pt had ultrasound of abdomen this afternoon. AC/HS - blood sugars WDL. Potassium covered per protocol. Bed low and locked, call light within reach, discussed safety measures w/ patient. Pt repositions independently.  Encouraged patient/family to exp Secondary Defect Length In Cm (Required For Flaps): 0.7

## 2023-02-22 ENCOUNTER — APPOINTMENT (OUTPATIENT)
Dept: GENERAL RADIOLOGY | Age: 38
End: 2023-02-22
Attending: NURSE PRACTITIONER
Payer: MEDICAID

## 2023-02-22 ENCOUNTER — HOSPITAL ENCOUNTER (OUTPATIENT)
Age: 38
Discharge: HOME | End: 2023-02-22
Payer: MEDICAID

## 2023-02-22 VITALS
SYSTOLIC BLOOD PRESSURE: 141 MMHG | TEMPERATURE: 98 F | WEIGHT: 165 LBS | BODY MASS INDEX: 29.23 KG/M2 | RESPIRATION RATE: 18 BRPM | OXYGEN SATURATION: 100 % | DIASTOLIC BLOOD PRESSURE: 81 MMHG | HEIGHT: 63 IN | HEART RATE: 91 BPM

## 2023-02-22 DIAGNOSIS — S99.922A INJURY OF TOE ON LEFT FOOT, INITIAL ENCOUNTER: Primary | ICD-10-CM

## 2023-02-22 PROCEDURE — 99213 OFFICE O/P EST LOW 20 MIN: CPT | Performed by: NURSE PRACTITIONER

## 2023-02-22 PROCEDURE — 73630 X-RAY EXAM OF FOOT: CPT | Performed by: NURSE PRACTITIONER

## 2023-02-23 NOTE — DISCHARGE INSTRUCTIONS
Change kena tape daily  Rest  Ice over a sock 20 minutes at a time a few times per day  Elevate extremity on 2 pillows when at rest  If no improvement in 1 week, see your primary care provider  Tylenol 650 mg every 4 hours for pain

## 2023-02-23 NOTE — ED INITIAL ASSESSMENT (HPI)
Pt c/o left 5th toe injury, stated that a 15 lbs speaker fell to her affected toe this morning, bruising+ swelling noted

## 2023-10-08 ENCOUNTER — APPOINTMENT (OUTPATIENT)
Dept: GENERAL RADIOLOGY | Age: 38
End: 2023-10-08
Attending: PHYSICIAN ASSISTANT
Payer: MEDICAID

## 2023-10-08 ENCOUNTER — HOSPITAL ENCOUNTER (OUTPATIENT)
Age: 38
Discharge: HOME | End: 2023-10-08
Payer: MEDICAID

## 2023-10-08 VITALS
RESPIRATION RATE: 18 BRPM | SYSTOLIC BLOOD PRESSURE: 132 MMHG | OXYGEN SATURATION: 100 % | HEART RATE: 105 BPM | DIASTOLIC BLOOD PRESSURE: 79 MMHG | TEMPERATURE: 97 F

## 2023-10-08 DIAGNOSIS — S63.634A SPRAIN OF INTERPHALANGEAL JOINT OF RIGHT RING FINGER, INITIAL ENCOUNTER: Primary | ICD-10-CM

## 2023-10-08 DIAGNOSIS — S62.664A CLOSED NONDISPLACED FRACTURE OF DISTAL PHALANX OF RIGHT RING FINGER, INITIAL ENCOUNTER: ICD-10-CM

## 2023-10-08 PROCEDURE — 73140 X-RAY EXAM OF FINGER(S): CPT | Performed by: PHYSICIAN ASSISTANT

## 2023-10-08 PROCEDURE — A4570 SPLINT: HCPCS | Performed by: PHYSICIAN ASSISTANT

## 2023-10-08 PROCEDURE — 99213 OFFICE O/P EST LOW 20 MIN: CPT | Performed by: PHYSICIAN ASSISTANT

## 2023-10-08 RX ORDER — ACETAMINOPHEN AND CODEINE PHOSPHATE 300; 30 MG/1; MG/1
1 TABLET ORAL EVERY 6 HOURS PRN
Qty: 10 TABLET | Refills: 0 | Status: SHIPPED | OUTPATIENT
Start: 2023-10-08 | End: 2023-10-13

## (undated) NOTE — LETTER
April 27, 2018          Elvis Gr          Dear Collin Stacy:      The results from your recent tests ordered by Dr. Yvette Hong.    Notes recorded by Anju Tang MD on 4/27/2018 at 10:44 AM CDT  Negative nayla

## (undated) NOTE — LETTER
Date & Time: 11/8/2022, 5:59 AM  Patient: Damon Cavanaugh  Encounter Provider(s):    Salma Odonnell MD       To Whom It May Concern:    Damon Cavanaugh was seen and treated in our department on 11/8/2022. She can return to work.     If you have any questions or concerns, please do not hesitate to call.        _____________________________  Physician/APC Signature

## (undated) NOTE — LETTER
Date & Time: 10/8/2023, 2:49 PM  Patient: Smita Hill  Encounter Provider(s):    Bean Hutchison       To Whom It May Concern:    Home Morejon was seen and treated in our department on 10/8/2023. She can return to work with these limitations: no use right hand until clearance  . If you have any questions or concerns, please do not hesitate to call.       Ken Trujillo   _____________________________  PNQYLGNMI/BRIANA Signature

## (undated) NOTE — LETTER
3/31/2020              [address] [facility] Maddie         Dear Jovan Li,    This letter is to inform you that our office has made several attempts to reach you by phone without success.   We were attempting t

## (undated) NOTE — LETTER
04/08/20        [address] [facility] [address]      Dear Osvaldo Victoria,    Our records indicate that you have outstanding lab work and or testing that was ordered for you and has not yet been completed:     ASSAY, THYROID STIM H